# Patient Record
Sex: MALE | Race: WHITE | NOT HISPANIC OR LATINO | Employment: OTHER | URBAN - METROPOLITAN AREA
[De-identification: names, ages, dates, MRNs, and addresses within clinical notes are randomized per-mention and may not be internally consistent; named-entity substitution may affect disease eponyms.]

---

## 2023-11-06 RX ORDER — ATORVASTATIN CALCIUM 40 MG/1
40 TABLET, FILM COATED ORAL
COMMUNITY

## 2023-11-06 RX ORDER — SULFAMETHOXAZOLE AND TRIMETHOPRIM 800; 160 MG/1; MG/1
1 TABLET ORAL 2 TIMES DAILY
COMMUNITY
Start: 2013-12-28 | End: 2023-11-07

## 2023-11-06 NOTE — PROGRESS NOTES
Neurosurgical Consultation      Rex Ontiveros is a 78 y.o. male is being seen for consultation today at the request of Rosalina Rehman PA-C for back pain. Today patient reports right sided lower back pain.    Chief Complaint   Patient presents with    Back Pain     New Evaluation        Previous treatment:    HPI: This is a 78-year-old gentleman who presents to the neurosurgery clinic for evaluation.  He comments that in early June he began having severe right groin region pain.  This was life altering.  It was severe enough that he went to the emergency department on June 4, 2023.  Since that time without any significant intervention he has had greater than 80% relief of this pain.  However beginning in mid August of this year he began noticing right paraspinal back pain near the lower thoracic or upper lumbar spine.  He does note that he has a history of a right inguinal hernia repair and this has been interrogated for signs of complication which was not present.  He has been managing his symptoms with Tylenol.  Currently his pain is adequately controlled.  He has not had any physical therapy.    History reviewed. No pertinent past medical history.     History reviewed. No pertinent surgical history.     Current Outpatient Medications on File Prior to Visit   Medication Sig Dispense Refill    atorvastatin (LIPITOR) 40 MG tablet Take 1 tablet by mouth.      levothyroxine (SYNTHROID, LEVOTHROID) 25 MCG tablet Take 1 tablet by mouth Every Morning.      metoprolol tartrate (LOPRESSOR) 25 MG tablet Take 1 tablet by mouth 2 (Two) Times a Day.      [DISCONTINUED] esomeprazole (nexIUM) 20 MG capsule NEXIUM CPDR      [DISCONTINUED] sulfamethoxazole-trimethoprim (BACTRIM DS,SEPTRA DS) 800-160 MG per tablet Take 1 tablet by mouth 2 (Two) Times a Day.       No current facility-administered medications on file prior to visit.        No Known Allergies     Social History     Socioeconomic History    Marital status:   "  Tobacco Use    Smoking status: Never    Smokeless tobacco: Never   Vaping Use    Vaping Use: Never used   Substance and Sexual Activity    Alcohol use: Never    Drug use: Never    Sexual activity: Defer          Review of Systems   HENT: Negative.     Eyes: Negative.    Respiratory: Negative.     Cardiovascular: Negative.    Gastrointestinal: Negative.    Endocrine: Negative.    Genitourinary: Negative.    Musculoskeletal:  Positive for arthralgias, back pain (right groin area) and myalgias.   Allergic/Immunologic: Negative.    Neurological: Negative.    Hematological: Negative.    Psychiatric/Behavioral:  Positive for sleep disturbance.         Physical Examination:     Vitals:    11/07/23 1052   BP: 139/91   Pulse: 85   Weight: 111 kg (244 lb)   Height: 185.4 cm (73\")   PainSc:   5        Physical Exam  Eyes:      General: Lids are normal.      Extraocular Movements: Extraocular movements intact.      Pupils: Pupils are equal, round, and reactive to light.   Psychiatric:         Speech: Speech normal.          Neurological Exam  Mental Status  Awake, alert and oriented to person, place and time. Speech is normal. Language is fluent with no aphasia.    Cranial Nerves  CN II: Visual fields full to confrontation.  CN III, IV, VI: Extraocular movements intact bilaterally. Normal lids and orbits bilaterally. Pupils equal round and reactive to light bilaterally.  CN V: Facial sensation is normal.  CN VII: Full and symmetric facial movement.  CN IX, X: Palate elevates symmetrically  CN XI: Shoulder shrug strength is normal.  CN XII: Tongue midline without atrophy or fasciculations.    Motor  Normal muscle bulk throughout. No pronator drift.    Sensory  Light touch is normal in upper and lower extremities.        Result Review  The following data was reviewed by: Adolfo Cameron MD on 11/07/2023:    Data reviewed : Radiologic studies MRI of the lumbar spine shows L4 and L5 spondylolisthesis with moderate lateral recess " stenosis.  There is also some mild to moderate foraminal stenosis in particular at this level.  There is some mild to moderate lateral recess stenosis at L5-L6.      Assessment/plan:  This is a 78-year-old gentleman who had an acute bout of right groin pain that has significantly improved without intervention.  He does have some focal paraspinal right sided upper lumbar lower thoracic back pain that it occurs with certain activities.  This does not significantly affecting his life.  The MRI of his lumbar spine does not seem to provide clear explanation for either of the symptoms.  I recommend a course of physical therapy.  I will also refer him for topical cream that may help with his pain.  He will return to see me if the physical therapy fails and he is having persistent pain.  I have encouraged him to call with any questions or concerns.    Diagnoses and all orders for this visit:    1. Back strain, initial encounter (Primary)  -     Ambulatory Referral to Physical Therapy Evaluate and treat  -     Ibuprofen 3 %, Gabapentin 10 %, Baclofen 2 %, lidocaine 4 %; Apply 1-2 g topically to the appropriate area as directed 3 (Three) to 4 (Four) times daily.  Dispense: 90 g; Refill: 3         Return if symptoms worsen or fail to improve.            Adolfo Cameron MD

## 2023-11-07 ENCOUNTER — OFFICE VISIT (OUTPATIENT)
Dept: NEUROSURGERY | Facility: CLINIC | Age: 78
End: 2023-11-07
Payer: OTHER GOVERNMENT

## 2023-11-07 VITALS
HEIGHT: 73 IN | BODY MASS INDEX: 32.34 KG/M2 | HEART RATE: 85 BPM | SYSTOLIC BLOOD PRESSURE: 139 MMHG | WEIGHT: 244 LBS | DIASTOLIC BLOOD PRESSURE: 91 MMHG

## 2023-11-07 DIAGNOSIS — S39.012A BACK STRAIN, INITIAL ENCOUNTER: Primary | ICD-10-CM

## 2023-11-07 PROCEDURE — 99204 OFFICE O/P NEW MOD 45 MIN: CPT | Performed by: NEUROLOGICAL SURGERY

## 2023-11-07 RX ORDER — LEVOTHYROXINE SODIUM 0.03 MG/1
25 TABLET ORAL
COMMUNITY

## 2023-11-22 ENCOUNTER — TREATMENT (OUTPATIENT)
Dept: PHYSICAL THERAPY | Facility: CLINIC | Age: 78
End: 2023-11-22
Payer: OTHER GOVERNMENT

## 2023-11-22 DIAGNOSIS — M54.50 CHRONIC MIDLINE LOW BACK PAIN WITHOUT SCIATICA: Primary | ICD-10-CM

## 2023-11-22 DIAGNOSIS — M25.551 RIGHT HIP PAIN: ICD-10-CM

## 2023-11-22 DIAGNOSIS — G89.29 CHRONIC MIDLINE LOW BACK PAIN WITHOUT SCIATICA: Primary | ICD-10-CM

## 2023-11-22 PROCEDURE — 97110 THERAPEUTIC EXERCISES: CPT | Performed by: PHYSICAL THERAPIST

## 2023-11-22 PROCEDURE — 97162 PT EVAL MOD COMPLEX 30 MIN: CPT | Performed by: PHYSICAL THERAPIST

## 2023-11-22 NOTE — PROGRESS NOTES
Physical Therapy Initial Evaluation and Plan of Care    Patient: Rex Ontiveros   : 1945  Diagnosis/ICD-10 Code:  Chronic midline low back pain without sciatica [M54.50, G89.29]  Referring practitioner: Adolfo Cameron MD  Date of initial visit : 2023  Today's Date: 2023  Patient seen for 1  session    Visit Diagnoses:    ICD-10-CM ICD-9-CM   1. Chronic midline low back pain without sciatica  M54.50 724.2    G89.29 338.29   2. Right hip pain  M25.551 719.45        Subjective Evaluation    History of Present Illness  Mechanism of injury: Patient is a 78 year old male who presents with a diagnosis of lower back pain.  Reports pain since 2023 with only specific activity lifting bags of grass in his yard.  Reports has severe right groin pain at the time which caused him to go to the ER.  Symptoms lessened over the past several months and has managed with activity modification and Tylenol.  Notes pain with activity including standing, lifting an improved with resting and medication.  Began to have pain in his lower back in August again with activity and has had since in addition to continued R groin pain. History of inguinal hernia repair R/L with some concern per groin pain but recent MD assessment that mesh was intact from repair.     Subjective comment: Revised Oswestry - 38%  Patient Occupation: Retired Quality of life: good    Pain  Current pain ratin  At best pain rating: 3  At worst pain ratin  Pain location: Lower back.  Quality: burning  Relieving factors: change in position and medications  Aggravating factors: prolonged positioning, standing and lifting    Social Support  Lives with: spouse    Diagnostic Tests  MRI studies: abnormal    Treatments  Previous treatment: medication  Current treatment: physical therapy  Patient Goals  Patient goals for therapy: decreased pain, increased motion, increased strength, independence with ADLs/IADLs and return to sport/leisure  activities           Objective          Palpation   Left   Hypertonic in the erector spinae and quadratus lumborum.     Right   Hypertonic in the erector spinae and quadratus lumborum. Tenderness of the erector spinae.     Additional Palpation Details  Negative palpation for pain R/L abdominal and groin region    Tenderness     Lumbar Spine  Tenderness in the spinous process.     Additional Tenderness Details  L1/2 SP and Facet joints R     Neurological Testing     Sensation     Lumbar   Left   Intact: light touch    Right   Intact: light touch    Reflexes   Left   Patellar (L4): brisk (3+)  Achilles (S1): trace (1+)    Right   Patellar (L4): brisk (3+)  Achilles (S1): trace (1+)    Active Range of Motion     Additional Active Range of Motion Details  LB FB - 70% limits  LB BB - 80% limits with pain  RSB - 80% limits with pain  LSB - 80% limits w/o pain  RR/LR - 80% limits w/o pain  R hip ER - 15 degrees  IR - -5 degrees  L hip ER - 20 degrees  IR 0 degrees  SLR - R 25 degrees  L 30 degrees    Strength/Myotome Testing     Left Hip   Planes of Motion   Flexion: 5    Right Hip   Planes of Motion   Flexion: 5    Left Knee   Flexion: 5  Extension: 5    Right Knee   Flexion: 5  Extension: 5    Left Ankle/Foot   Dorsiflexion: 5    Right Ankle/Foot   Dorsiflexion: 5    Tests       Thoracic   Negative slump.     Lumbar     Left   Positive quadrant.     Ambulation     Comments   Short step length and gait velocity, slight frontal plane compensation to R, decreased push of of R LE        Assessment & Plan       Assessment  Impairments: abnormal gait, abnormal or restricted ROM, activity intolerance, impaired balance, lacks appropriate home exercise program and pain with function   Functional limitations: carrying objects, lifting, walking, pulling, pushing, uncomfortable because of pain, standing and stooping   Assessment details: Presents with severe limits lumbar spine and B hip ROM, pain with SB R and EXT and pain to  palpation R upper lumber region, altered gait with limits in trunk and pelvic rotation/UE swing, gait velocity and activity tolerance in including forward bending, twisting, prolonged positioning.  He would benefit from skilled PT to address the above and restore to the highest level of prior function.   Prognosis: good    Goals  Plan Goals: ST. Improved gait mechanics with increased arm swing, trunk/pelvic rotation over 3 weeks.   2. B hip ER/IR AROM improved 10 degrees or greater R/L LE over 3 weeks.   3. LBP and R groin pain decreased 10% or greater with ADL's over 3 weeks.   4. Independent and compliant with HEP over 3 weeks.     LT. Revised Oswestry 20% or greater over 10 weeks.   2. Lumbar AROM all planes 60% limits or less w/o pain over 10 weeks.   3. B hip ER/IR improved 20 degrees or greater to allow improved ability for gait and ADL's with less compensation over 10 weeks.   4. LBP and R groin pain decreased 50% or greater over 10 weeks.     Plan  Planned modality interventions: cryotherapy, electrical stimulation/Russian stimulation, TENS, dry needling, thermotherapy (hydrocollator packs) and traction (As appropriate)  Planned therapy interventions: manual therapy, neuromuscular re-education, balance/weight-bearing training, soft tissue mobilization, flexibility, spinal/joint mobilization, functional ROM exercises, strengthening, stretching, gait training, home exercise program, therapeutic activities, transfer training and joint mobilization  Frequency: 2x week  Duration in weeks: 10  Treatment plan discussed with: patient and family      History # of Personal Factors and/or Comorbidities: MODERATE (1-2)  Examination of Body System(s): # of elements: MODERATE (3)  Clinical Presentation: EVOLVING  Clinical Decision Making: MODERATE       Timed:         Manual Therapy:    5     mins  63099;     Therapeutic Exercise:    10     mins  78776;         Un-Timed:  Mod Eval     35     Mins   95107        Timed Treatment:   15   mins   Total Treatment:     50   mins          PT SIGNATURE: Mich Ochoa PT, DPT   [place] Lic #[id number]    DATE TREATMENT INITIATED: 11/22/2023    Initial Certification  Certification Period: 2/20/2024  I certify that the therapy services are furnished while this patient is under my care.  The services outlined above are required by this patient, and will be reviewed every 90 days.     PHYSICIAN: Adolfo Cameron MD      DATE:     Please sign and return via fax to [phone].. Thank you, [facility].

## 2023-11-29 ENCOUNTER — TREATMENT (OUTPATIENT)
Dept: PHYSICAL THERAPY | Facility: CLINIC | Age: 78
End: 2023-11-29
Payer: OTHER GOVERNMENT

## 2023-11-29 DIAGNOSIS — M54.50 CHRONIC MIDLINE LOW BACK PAIN WITHOUT SCIATICA: Primary | ICD-10-CM

## 2023-11-29 DIAGNOSIS — M25.551 RIGHT HIP PAIN: ICD-10-CM

## 2023-11-29 DIAGNOSIS — G89.29 CHRONIC MIDLINE LOW BACK PAIN WITHOUT SCIATICA: Primary | ICD-10-CM

## 2023-11-29 PROCEDURE — 97110 THERAPEUTIC EXERCISES: CPT | Performed by: PHYSICAL THERAPIST

## 2023-11-29 PROCEDURE — 97140 MANUAL THERAPY 1/> REGIONS: CPT | Performed by: PHYSICAL THERAPIST

## 2023-11-29 NOTE — PROGRESS NOTES
Physical Therapy Daily Treatment Note  Visit: 2    Rex Ontiveros reports: did well with initial visit and HEP.    YOB: 1945  Referring practitioner : Adolfo Cameron MD  Date of Initial: Type: THERAPY  Noted: 11/22/2023  Today's date: 11/29/2023  Patient seen for 2 sessions    Visit Diagnoses:    ICD-10-CM ICD-9-CM   1. Chronic midline low back pain without sciatica  M54.50 724.2    G89.29 338.29   2. Right hip pain  M25.551 719.45       Subjective       Objective   See Exercise, Manual, and Modality Logs for complete treatment.       Assessment/Plan    Limited but improved trunk/pelvic/hip ROM post tx. Still requires cueing for low effort with ROM exercise to avoid excessive muscular tension.     Plan:    Continue current         Timed:         Manual Therapy:    10     mins  02430;     Therapeutic Exercise:    20     mins  90683;           Timed Treatment:   30   mins   Total Treatment:     30   mins         Mich Ochoa PT, DPT  Physical Therapist  [place] Lic #[id number]

## 2023-12-01 ENCOUNTER — TREATMENT (OUTPATIENT)
Dept: PHYSICAL THERAPY | Facility: CLINIC | Age: 78
End: 2023-12-01
Payer: OTHER GOVERNMENT

## 2023-12-01 DIAGNOSIS — G89.29 CHRONIC MIDLINE LOW BACK PAIN WITHOUT SCIATICA: Primary | ICD-10-CM

## 2023-12-01 DIAGNOSIS — M25.551 RIGHT HIP PAIN: ICD-10-CM

## 2023-12-01 DIAGNOSIS — M54.50 CHRONIC MIDLINE LOW BACK PAIN WITHOUT SCIATICA: Primary | ICD-10-CM

## 2023-12-01 PROCEDURE — 97110 THERAPEUTIC EXERCISES: CPT | Performed by: PHYSICAL THERAPIST

## 2023-12-01 PROCEDURE — 97140 MANUAL THERAPY 1/> REGIONS: CPT | Performed by: PHYSICAL THERAPIST

## 2023-12-01 NOTE — PROGRESS NOTES
Physical Therapy Daily Progress Note      Patient: Rex Ontiveros   : 1945  Diagnosis/ICD-10 Code:  Chronic midline low back pain without sciatica [M54.50, G89.29]  Referring practitioner: Adolfo Cameron MD  Date of Initial Visit: Type: THERAPY  Noted: 2023  Today's Date: 2023  Patient seen for 3 sessions             Subjective Continues to have pain in his LB on the right side    Objective   See Exercise, Manual, and Modality Logs for complete treatment.       Assessment/Plan  Responded to exercises and manual techniques well overall    Progress per Plan of Care           Timed:         Manual Therapy:    10     mins  88798;     Therapeutic Exercise:    20     mins  83914;         Timed Treatment:   30   mins   Total Treatment:     30   mins        Mitch Case PTA  Physical Therapist Assistant

## 2023-12-08 ENCOUNTER — TREATMENT (OUTPATIENT)
Dept: PHYSICAL THERAPY | Facility: CLINIC | Age: 78
End: 2023-12-08
Payer: OTHER GOVERNMENT

## 2023-12-08 DIAGNOSIS — G89.29 CHRONIC MIDLINE LOW BACK PAIN WITHOUT SCIATICA: Primary | ICD-10-CM

## 2023-12-08 DIAGNOSIS — M25.551 RIGHT HIP PAIN: ICD-10-CM

## 2023-12-08 DIAGNOSIS — M54.50 CHRONIC MIDLINE LOW BACK PAIN WITHOUT SCIATICA: Primary | ICD-10-CM

## 2023-12-08 PROCEDURE — 97110 THERAPEUTIC EXERCISES: CPT | Performed by: PHYSICAL THERAPIST

## 2023-12-08 PROCEDURE — 97140 MANUAL THERAPY 1/> REGIONS: CPT | Performed by: PHYSICAL THERAPIST

## 2023-12-08 NOTE — PROGRESS NOTES
Physical Therapy Daily Progress Note      Patient: Rex Onitveros   : 1945  Diagnosis/ICD-10 Code:  Chronic midline low back pain without sciatica [M54.50, G89.29]  Referring practitioner: Adolfo Cameron MD  Date of Initial Visit: Type: THERAPY  Noted: 2023  Today's Date: 2023  Patient seen for 4 sessions             Subjective feeling a little better    Objective   See Exercise, Manual, and Modality Logs for complete treatment.       Assessment/Plan  Responded well with progression of exercises and with manual techniques    Progress per Plan of Care           Timed:         Manual Therapy:    10     mins  23343;     Therapeutic Exercise:    20     mins  38193;       Timed Treatment:   30   mins   Total Treatment:     30   mins        Mitch Case PTA  Physical Therapist Assistant

## 2023-12-11 ENCOUNTER — TREATMENT (OUTPATIENT)
Dept: PHYSICAL THERAPY | Facility: CLINIC | Age: 78
End: 2023-12-11
Payer: OTHER GOVERNMENT

## 2023-12-11 DIAGNOSIS — M25.551 RIGHT HIP PAIN: ICD-10-CM

## 2023-12-11 DIAGNOSIS — M54.50 CHRONIC MIDLINE LOW BACK PAIN WITHOUT SCIATICA: Primary | ICD-10-CM

## 2023-12-11 DIAGNOSIS — G89.29 CHRONIC MIDLINE LOW BACK PAIN WITHOUT SCIATICA: Primary | ICD-10-CM

## 2023-12-11 PROCEDURE — 97110 THERAPEUTIC EXERCISES: CPT | Performed by: PHYSICAL THERAPIST

## 2023-12-11 PROCEDURE — 97140 MANUAL THERAPY 1/> REGIONS: CPT | Performed by: PHYSICAL THERAPIST

## 2023-12-11 NOTE — PROGRESS NOTES
"Physical Therapy Daily Treatment Note  Visit: 5    Rex Ontiveros reports: does feels better with less groin/LBP but still gets intermittent \"jolts of pain\" that \"let him know it is still there.\"  YOB: 1945  Referring practitioner : Adolfo Cameron MD  Date of Initial: Type: THERAPY  Noted: 11/22/2023  Today's date: 12/11/2023  Patient seen for 5 sessions    Visit Diagnoses:    ICD-10-CM ICD-9-CM   1. Chronic midline low back pain without sciatica  M54.50 724.2    G89.29 338.29   2. Right hip pain  M25.551 719.45       Subjective       Objective   See Exercise, Manual, and Modality Logs for complete treatment.       Assessment/Plan    R hip and lumbopelvic ROM and motor control improving with decreasing LBP.  Tolerated standing exercise progressions well.     Plan:    Continue current           Timed:         Manual Therapy:    12     mins  10826;     Therapeutic Exercise:    18     mins  72642;           Timed Treatment:   30   mins   Total Treatment:     30   mins         Mich Ochoa PT, DPT  Physical Therapist  [place] Lic #[id number]  "

## 2023-12-15 ENCOUNTER — TREATMENT (OUTPATIENT)
Dept: PHYSICAL THERAPY | Facility: CLINIC | Age: 78
End: 2023-12-15
Payer: OTHER GOVERNMENT

## 2023-12-15 DIAGNOSIS — M54.50 CHRONIC MIDLINE LOW BACK PAIN WITHOUT SCIATICA: Primary | ICD-10-CM

## 2023-12-15 DIAGNOSIS — G89.29 CHRONIC MIDLINE LOW BACK PAIN WITHOUT SCIATICA: Primary | ICD-10-CM

## 2023-12-15 DIAGNOSIS — M25.551 RIGHT HIP PAIN: ICD-10-CM

## 2023-12-15 PROCEDURE — 97140 MANUAL THERAPY 1/> REGIONS: CPT | Performed by: PHYSICAL THERAPIST

## 2023-12-15 PROCEDURE — 97110 THERAPEUTIC EXERCISES: CPT | Performed by: PHYSICAL THERAPIST

## 2023-12-15 NOTE — PROGRESS NOTES
Physical Therapy Daily Progress Note      Patient: Rex Ontiveros   : 1945  Diagnosis/ICD-10 Code:  Chronic midline low back pain without sciatica [M54.50, G89.29]  Referring practitioner: Adolfo Cameron MD  Date of Initial Visit: Type: THERAPY  Noted: 2023  Today's Date: 12/15/2023  Patient seen for 6 sessions             Subjective Has c/o sharp stabbing sensation in his right groin region.    Objective   See Exercise, Manual, and Modality Logs for complete treatment.       Assessment/Plan  Pt seemed to respond well overall to exercises and manual techniques today.  Pt required a few verbal and tactile cues for improvement of exercise technique.    Progress per Plan of Care           Timed:         Manual Therapy:    15     mins  70420;     Therapeutic Exercise:    23     mins  42871;         Timed Treatment:   38   mins   Total Treatment:     38   mins        Mitch Case PTA  Physical Therapist Assistant

## 2023-12-18 ENCOUNTER — TREATMENT (OUTPATIENT)
Dept: PHYSICAL THERAPY | Facility: CLINIC | Age: 78
End: 2023-12-18
Payer: OTHER GOVERNMENT

## 2023-12-18 DIAGNOSIS — M54.50 CHRONIC MIDLINE LOW BACK PAIN WITHOUT SCIATICA: Primary | ICD-10-CM

## 2023-12-18 DIAGNOSIS — M25.551 RIGHT HIP PAIN: ICD-10-CM

## 2023-12-18 DIAGNOSIS — G89.29 CHRONIC MIDLINE LOW BACK PAIN WITHOUT SCIATICA: Primary | ICD-10-CM

## 2023-12-18 PROCEDURE — 97110 THERAPEUTIC EXERCISES: CPT | Performed by: PHYSICAL THERAPIST

## 2023-12-18 PROCEDURE — 97140 MANUAL THERAPY 1/> REGIONS: CPT | Performed by: PHYSICAL THERAPIST

## 2023-12-18 NOTE — PROGRESS NOTES
Physical Therapy Daily Progress Note      Patient: Rex Ontiveros   : 1945  Diagnosis/ICD-10 Code:  Chronic midline low back pain without sciatica [M54.50, G89.29]  Referring practitioner: Adolfo Cameron MD  Date of Initial Visit: Type: THERAPY  Noted: 2023  Today's Date: 2023  Patient seen for 7 sessions             Subjective Pt reports being sore from this past weekend.    Objective   See Exercise, Manual, and Modality Logs for complete treatment.       Assessment/Plan  Mat mobility is somewhat challenging for patient.  Tolerated manual techniques and exercises fairly well today    Progress per Plan of Care           Timed:         Manual Therapy:    15     mins  35764;     Therapeutic Exercise:    20     mins  79370;         Timed Treatment:   35   mins   Total Treatment:     35   mins        Mitch Case PTA  Physical Therapist Assistant

## 2023-12-21 ENCOUNTER — TREATMENT (OUTPATIENT)
Dept: PHYSICAL THERAPY | Facility: CLINIC | Age: 78
End: 2023-12-21
Payer: OTHER GOVERNMENT

## 2023-12-21 DIAGNOSIS — M25.551 RIGHT HIP PAIN: ICD-10-CM

## 2023-12-21 DIAGNOSIS — G89.29 CHRONIC MIDLINE LOW BACK PAIN WITHOUT SCIATICA: Primary | ICD-10-CM

## 2023-12-21 DIAGNOSIS — M54.50 CHRONIC MIDLINE LOW BACK PAIN WITHOUT SCIATICA: Primary | ICD-10-CM

## 2023-12-21 PROCEDURE — 97110 THERAPEUTIC EXERCISES: CPT | Performed by: PHYSICAL THERAPIST

## 2023-12-21 PROCEDURE — 97140 MANUAL THERAPY 1/> REGIONS: CPT | Performed by: PHYSICAL THERAPIST

## 2023-12-21 NOTE — PROGRESS NOTES
Physical Therapy Daily Progress Note      Patient: Rex Ontiveros   : 1945  Diagnosis/ICD-10 Code:  Chronic midline low back pain without sciatica [M54.50, G89.29]  Referring practitioner: Adolfo Cameron MD  Date of Initial Visit: Type: THERAPY  Noted: 2023  Today's Date: 2023  Patient seen for 8 sessions             Subjective Still having back pain.  No significant change since last visit    Objective   See Exercise, Manual, and Modality Logs for complete treatment.       Assessment/Plan Difficulty with bed mobility still persists.  Fair tolerance with exercises    Progress per Plan of Care           Timed:         Manual Therapy:    10     mins  19656;     Therapeutic Exercise:    20     mins  29377;         Timed Treatment:   30   mins   Total Treatment:     30   mins        Mitch Case PTA  Physical Therapist Assistant

## 2023-12-27 ENCOUNTER — TREATMENT (OUTPATIENT)
Dept: PHYSICAL THERAPY | Facility: CLINIC | Age: 78
End: 2023-12-27
Payer: OTHER GOVERNMENT

## 2023-12-27 DIAGNOSIS — M54.50 CHRONIC MIDLINE LOW BACK PAIN WITHOUT SCIATICA: Primary | ICD-10-CM

## 2023-12-27 DIAGNOSIS — M25.551 RIGHT HIP PAIN: ICD-10-CM

## 2023-12-27 DIAGNOSIS — G89.29 CHRONIC MIDLINE LOW BACK PAIN WITHOUT SCIATICA: Primary | ICD-10-CM

## 2023-12-27 PROCEDURE — 97140 MANUAL THERAPY 1/> REGIONS: CPT | Performed by: PHYSICAL THERAPIST

## 2023-12-27 PROCEDURE — 97110 THERAPEUTIC EXERCISES: CPT | Performed by: PHYSICAL THERAPIST

## 2023-12-28 NOTE — PROGRESS NOTES
"Re-Assessment / Progress Note    Patient: Rex Ontiveros   : 1945  Diagnosis/ICD-10 Code:  Chronic midline low back pain without sciatica [M54.50, G89.29]  Referring practitioner: Adolfo Cameron MD  Date of Initial Visit: Episode Type: THERAPY  Noted: 2023    Today's Date: 2023  Patient seen for 9 sessions.      Subjective:   Rex Ontiveros reports: he can tell improvement both in his lower back and right groin pain since beginning PT.  Reports still intermittent \"jolts\" of pain with certain movement but less frequent though still intense pain.   Subjective Questionnaire: Oswestry: 34%  Clinical Progress: improved  Home Program Compliance: Yes  Treatment has included: therapeutic exercise, neuromuscular re-education, manual therapy, therapeutic activity, and moist heat    Subjective   Objective          Active Range of Motion   Left Hip   Flexion: 30 degrees   External rotation (90/90): 25 degrees   Internal rotation (90/90): 10 degrees     Right Hip   Flexion: 35 degrees   External rotation (90/90): 30 degrees   Internal rotation (90/90): 8 degrees     Ambulation     Comments   Improved but limited stance time and push off B, decreased frontal plane compensation towards R during R stance      Assessment & Plan       Assessment  Assessment details: All STG met and all LTG progressed towards or met.  Improving lumbopelvic ROM, gait mechanics and transitional movements with rolling, supine to sit, sit to stand. To continued skill care to progress further towards or meet LTG and prior level of function.   Progress toward previous goals:  All met or progressed towards    Goals    Short-term goals (STG):   1. Improved gait mechanics with increased arm swing, trunk/pelvic rotation over 3 weeks. - Met  2. B hip ER/IR AROM improved 10 degrees or greater R/L LE over 3 weeks. - Met  3. LBP and R groin pain decreased 10% or greater with ADL's over 3 weeks. - Met  4. Independent and compliant with HEP over " 3 week - Met      Long-term goals (LTG):   1. Revised Oswestry 20% or greater over 10 weeks. - Progressed towards  2. Lumbar AROM all planes 60% limits or less w/o pain over 10 weeks. - Progressed towards   3. B hip ER/IR improved 20 degrees or greater to allow improved ability for gait and ADL's with less compensation over 10 weeks. - Progressed towards  4. LBP and R groin pain decreased 50% or greater over 10 weeks - Progressed towards         Recommendations: Continue with recommendations to continue PT to meet LTG and prior level of function  Timeframe: 6 weeks  Prognosis to achieve goals: good    PT Signature: Mich Ochoa, PT, DPT          Based upon review of the patient's progress and continued therapy plan, it is my medical opinion that Rex Ontiveros should continue physical therapy treatment at [facility]  [address] [place] [address] [address] [place] [place] [id number] [phone].        Timed:         Manual Therapy:    10     mins  23740;     Therapeutic Exercise:    15     mins  00779;             Timed Treatment:   25   mins   Total Treatment:     25   mins

## 2023-12-29 ENCOUNTER — TREATMENT (OUTPATIENT)
Dept: PHYSICAL THERAPY | Facility: CLINIC | Age: 78
End: 2023-12-29
Payer: OTHER GOVERNMENT

## 2023-12-29 DIAGNOSIS — G89.29 CHRONIC MIDLINE LOW BACK PAIN WITHOUT SCIATICA: Primary | ICD-10-CM

## 2023-12-29 DIAGNOSIS — M54.50 CHRONIC MIDLINE LOW BACK PAIN WITHOUT SCIATICA: Primary | ICD-10-CM

## 2023-12-29 DIAGNOSIS — M25.551 RIGHT HIP PAIN: ICD-10-CM

## 2023-12-29 PROCEDURE — 97110 THERAPEUTIC EXERCISES: CPT | Performed by: PHYSICAL THERAPIST

## 2023-12-29 PROCEDURE — 97140 MANUAL THERAPY 1/> REGIONS: CPT | Performed by: PHYSICAL THERAPIST

## 2023-12-29 NOTE — PROGRESS NOTES
Physical Therapy Daily Progress Note      Patient: Rex Ontiveros   : 1945  Diagnosis/ICD-10 Code:  Chronic midline low back pain without sciatica [M54.50, G89.29]  Referring practitioner: Adolfo Cameron MD  Date of Initial Visit: Type: THERAPY  Noted: 2023  Today's Date: 2023  Patient seen for 10 sessions             Subjective Nothing new to report this morning    Objective   See Exercise, Manual, and Modality Logs for complete treatment.       Assessment/Plan  Pt's overall mobility is still slow and guarded, especially transferring supine to sit.  Pt needs vc's for better standing posture with resisted sidestepping.    Progress per Plan of Care           Timed:         Manual Therapy:    8     mins  50333;     Therapeutic Exercise:    22     mins  95086;         Timed Treatment:   30   mins   Total Treatment:     30   mins        Mitch Case PTA  Physical Therapist Assistant

## 2024-01-03 ENCOUNTER — TREATMENT (OUTPATIENT)
Dept: PHYSICAL THERAPY | Facility: CLINIC | Age: 79
End: 2024-01-03
Payer: OTHER GOVERNMENT

## 2024-01-03 DIAGNOSIS — G89.29 CHRONIC MIDLINE LOW BACK PAIN WITHOUT SCIATICA: Primary | ICD-10-CM

## 2024-01-03 DIAGNOSIS — M25.551 RIGHT HIP PAIN: ICD-10-CM

## 2024-01-03 DIAGNOSIS — M54.50 CHRONIC MIDLINE LOW BACK PAIN WITHOUT SCIATICA: Primary | ICD-10-CM

## 2024-01-03 PROCEDURE — 97140 MANUAL THERAPY 1/> REGIONS: CPT | Performed by: PHYSICAL THERAPIST

## 2024-01-03 PROCEDURE — 97110 THERAPEUTIC EXERCISES: CPT | Performed by: PHYSICAL THERAPIST

## 2024-01-03 NOTE — PROGRESS NOTES
Physical Therapy Daily Progress Note      Patient: Rex Ontiveros   : 1945  Diagnosis/ICD-10 Code:  Chronic midline low back pain without sciatica [M54.50, G89.29]  Referring practitioner: Adolfo Cameron MD  Date of Initial Visit: Type: THERAPY  Noted: 2023  Today's Date: 1/3/2024  Patient seen for 11 sessions             Subjective Still has pain more in the right side of his low back    Objective   See Exercise, Manual, and Modality Logs for complete treatment.       Assessment/Plan  Movement is still slow and guarded, especially with mat mobility.  Progress has been slow    Progress per Plan of Care           Timed:         Manual Therapy:    8     mins  54564;     Therapeutic Exercise:    22     mins  49256;         Timed Treatment:   30   mins   Total Treatment:     30   mins        Mitch Case PTA  Physical Therapist Assistant

## 2024-01-09 ENCOUNTER — TREATMENT (OUTPATIENT)
Dept: PHYSICAL THERAPY | Facility: CLINIC | Age: 79
End: 2024-01-09
Payer: OTHER GOVERNMENT

## 2024-01-09 DIAGNOSIS — G89.29 CHRONIC MIDLINE LOW BACK PAIN WITHOUT SCIATICA: Primary | ICD-10-CM

## 2024-01-09 DIAGNOSIS — M54.50 CHRONIC MIDLINE LOW BACK PAIN WITHOUT SCIATICA: Primary | ICD-10-CM

## 2024-01-09 DIAGNOSIS — M25.551 RIGHT HIP PAIN: ICD-10-CM

## 2024-01-09 PROCEDURE — 97110 THERAPEUTIC EXERCISES: CPT | Performed by: PHYSICAL THERAPIST

## 2024-01-09 NOTE — PROGRESS NOTES
Physical Therapy Daily Progress Note      Patient: Rex Ontiveros   : 1945  Diagnosis/ICD-10 Code:  Chronic midline low back pain without sciatica [M54.50, G89.29]  Referring practitioner: Adolfo Cameron MD  Date of Initial Visit: Type: THERAPY  Noted: 2023  Today's Date: 2024  Patient seen for 12 sessions             Subjective Nothing new to report today    Objective   See Exercise, Manual, and Modality Logs for complete treatment.       Assessment/Plan  Fair response with PT intervention. Overall movements are slow and guarded.     Progress per Plan of Care           Timed:         Manual Therapy:    6     mins  79326;     Therapeutic Exercise:    24     mins  80474;         Timed Treatment:   30   mins   Total Treatment:     30   mins        Mitch Case PTA  Physical Therapist Assistant

## 2024-01-12 ENCOUNTER — TREATMENT (OUTPATIENT)
Dept: PHYSICAL THERAPY | Facility: CLINIC | Age: 79
End: 2024-01-12
Payer: OTHER GOVERNMENT

## 2024-01-12 DIAGNOSIS — G89.29 CHRONIC MIDLINE LOW BACK PAIN WITHOUT SCIATICA: Primary | ICD-10-CM

## 2024-01-12 DIAGNOSIS — M25.551 RIGHT HIP PAIN: ICD-10-CM

## 2024-01-12 DIAGNOSIS — M54.50 CHRONIC MIDLINE LOW BACK PAIN WITHOUT SCIATICA: Primary | ICD-10-CM

## 2024-01-12 PROCEDURE — 97110 THERAPEUTIC EXERCISES: CPT | Performed by: PHYSICAL THERAPIST

## 2024-01-12 PROCEDURE — 97140 MANUAL THERAPY 1/> REGIONS: CPT | Performed by: PHYSICAL THERAPIST

## 2024-01-15 NOTE — PROGRESS NOTES
Physical Therapy Daily Treatment Note  Visit: 13    Rex Ontiveros reports: can tell he improving with minimal sharp pains in his right hip/groin at this time and less stiffness generally in his back.   YOB: 1945  Referring practitioner : Adolfo Cameron MD  Date of Initial: Type: THERAPY  Noted: 11/22/2023  Today's date: 1/15/2024  Patient seen for 13 sessions    Visit Diagnoses:    ICD-10-CM ICD-9-CM   1. Chronic midline low back pain without sciatica  M54.50 724.2    G89.29 338.29   2. Right hip pain  M25.551 719.45       Subjective       Objective   See Exercise, Manual, and Modality Logs for complete treatment.       Assessment/Plan    Improving hip/back pain and general quality of movement.  Decreased pain and stiffness to palpation with rotational movements of the spine and with gait and reaching activities in standing.     Plan:    Continue - progress towards meeting LTG and prior level of function.              Timed:         Manual Therapy:    10     mins  84080;     Therapeutic Exercise:    15     mins  54200;         Timed Treatment:   25   mins   Total Treatment:     25   mins         Mich Ochoa PT, DPT  Physical Therapist  [place] Lic #[id number]

## 2024-01-16 ENCOUNTER — TREATMENT (OUTPATIENT)
Dept: PHYSICAL THERAPY | Facility: CLINIC | Age: 79
End: 2024-01-16
Payer: OTHER GOVERNMENT

## 2024-01-16 DIAGNOSIS — M54.50 CHRONIC MIDLINE LOW BACK PAIN WITHOUT SCIATICA: Primary | ICD-10-CM

## 2024-01-16 DIAGNOSIS — M25.551 RIGHT HIP PAIN: ICD-10-CM

## 2024-01-16 DIAGNOSIS — G89.29 CHRONIC MIDLINE LOW BACK PAIN WITHOUT SCIATICA: Primary | ICD-10-CM

## 2024-01-16 PROCEDURE — 97110 THERAPEUTIC EXERCISES: CPT | Performed by: PHYSICAL THERAPIST

## 2024-01-16 PROCEDURE — 97140 MANUAL THERAPY 1/> REGIONS: CPT | Performed by: PHYSICAL THERAPIST

## 2024-01-16 NOTE — PROGRESS NOTES
Physical Therapy Daily Progress Note      Patient: Rex Ontiveros   : 1945  Diagnosis/ICD-10 Code:  Chronic midline low back pain without sciatica [M54.50, G89.29]  Referring practitioner: Adolfo Cameron MD  Date of Initial Visit: Type: THERAPY  Noted: 2023  Today's Date: 2024  Patient seen for 14 sessions             Subjective Feeling pretty good today and feeling much better overall.    Objective   See Exercise, Manual, and Modality Logs for complete treatment.       Assessment/Plan  Progressing.  Pt did have c/o L LE/calf cramps with manual LE distraction    Progress per Plan of Care           Timed:         Manual Therapy:    8     mins  92587;     Therapeutic Exercise:    30     mins  96602;       Timed Treatment:   38   mins   Total Treatment:     38   mins        Mitch Case PTA  Physical Therapist Assistant

## 2024-01-26 ENCOUNTER — TREATMENT (OUTPATIENT)
Dept: PHYSICAL THERAPY | Facility: CLINIC | Age: 79
End: 2024-01-26
Payer: OTHER GOVERNMENT

## 2024-01-26 DIAGNOSIS — M54.50 CHRONIC MIDLINE LOW BACK PAIN WITHOUT SCIATICA: Primary | ICD-10-CM

## 2024-01-26 DIAGNOSIS — G89.29 CHRONIC MIDLINE LOW BACK PAIN WITHOUT SCIATICA: Primary | ICD-10-CM

## 2024-01-26 NOTE — PROGRESS NOTES
Re-Assessment / Progress Note    Patient: Rex Ontiveros   : 1945  Diagnosis/ICD-10 Code:  Chronic midline low back pain without sciatica [M54.50, G89.29]  Referring practitioner: Adolfo Cameron MD  Date of Initial Visit: Episode Type: THERAPY  Noted: 2023    Today's Date: 2024  Patient seen for 15 sessions.      Subjective:   Rex Ontiveros reports: significant improvement in initial symptoms with little to no sharp pain in his right hip and improved lower back pain.  Reports still has pain on the right side of his back especially going from laying down to standing but tends to improved over time as he is on his feet for short durations   Subjective Questionnaire: Oswestry: 20%  Clinical Progress: improved  Home Program Compliance: Yes  Treatment has included: therapeutic exercise, neuromuscular re-education, manual therapy, therapeutic activity, moist heat, and cryotherapy    Subjective   Objective   Assessment & Plan       Assessment  Impairments: abnormal or restricted ROM, impaired balance and pain with function   Functional limitations: carrying objects, lifting, walking, standing and reaching overhead   Assessment details: Present with continued but improved limits in B hip, lumbar spine/thoracic spine ROM and activity tolerance.  R hip pain has mostly resolved and lower back pain decreased and typically resolves after standing and movement  for a few movement.  Has met or progressed towards all STG/LTG with good understanding for continuation of HEP only.   Prognosis: good      Progress toward previous goals:  All met or progressed towards    Goals    Short-term goals (STG):   1. Improved gait mechanics with increased arm swing, trunk/pelvic rotation over 3 weeks. - Met  2. B hip ER/IR AROM improved 10 degrees or greater R/L LE over 3 weeks. - Met  3. LBP and R groin pain decreased 10% or greater with ADL's over 3 weeks. - Met  4. Independent and compliant with HEP over 3 week -  Met      Long-term goals (LTG):   1. Revised Oswestry 20% or greater over 10 weeks. - Met  2. Lumbar AROM all planes 60% limits or less w/o pain over 10 weeks. - Progressed towards   3. B hip ER/IR improved 20 degrees or greater to allow improved ability for gait and ADL's with less compensation over 10 weeks. - Progressed towards  4. LBP and R groin pain decreased 50% or greater over 10 weeks - Progressed towards         Recommendations: Continue with recommendations for trial HEP only   Timeframe: 1 week  Prognosis to achieve goals: good    PT Signature: Mich Ochoa, PT, DPT          Based upon review of the patient's progress and continued therapy plan, it is my medical opinion that Rex Ontiveros should continue physical therapy treatment at [facility]  [address] [place] [id number] [phone].        Timed:         Manual Therapy:    8     mins  21482;     Therapeutic Exercise:    20     mins  70190;     Therapeutic Activity:     10     mins  01096;             Timed Treatment:   38   mins   Total Treatment:     38   mins

## 2024-02-06 ENCOUNTER — TELEPHONE (OUTPATIENT)
Dept: NEUROSURGERY | Facility: CLINIC | Age: 79
End: 2024-02-06
Payer: OTHER GOVERNMENT

## 2024-02-06 ENCOUNTER — ANCILLARY ORDERS (OUTPATIENT)
Dept: OTHER | Facility: HOSPITAL | Age: 79
End: 2024-02-06
Payer: OTHER GOVERNMENT

## 2024-02-06 ENCOUNTER — APPOINTMENT (OUTPATIENT)
Dept: OTHER | Facility: HOSPITAL | Age: 79
End: 2024-02-06
Payer: OTHER GOVERNMENT

## 2024-02-06 NOTE — TELEPHONE ENCOUNTER
Disc 1  Outside images were loaded to our system.  Date of images: 10/13/2023  Images loaded: Lumbar MRI and Renal [facility]  Placed at .    Disc 2    Outside images were loaded to our system.  Date of images:9/20/2023  Images loaded: L XR [facility]  Placed at .